# Patient Record
(demographics unavailable — no encounter records)

---

## 2025-03-23 NOTE — REVIEW OF SYSTEMS
[Negative] : Genitourinary Ear Wedge Repair Text: Due to exposed cartilage and to restore structure and function of the ear, a wedge excision was completed by carrying down an excision through the full thickness of the ear and cartilage with an inward facing Burow's triangle. The wound was then closed in a layered fashion.

## 2025-03-23 NOTE — DISCUSSION/SUMMARY
[Normal Growth] : growth [Normal Development] : development  [Physical Growth and Development] : physical growth and development [Social and Academic Competence] : social and academic competence [Emotional Well-Being] : emotional well-being [Risk Reduction] : risk reduction [Violence and Injury Prevention] : violence and injury prevention [Full Activity without restrictions including Physical Education & Athletics] : Full Activity without restrictions including Physical Education & Athletics [] : The components of the vaccine(s) to be administered today are listed in the plan of care. The disease(s) for which the vaccine(s) are intended to prevent and the risks have been discussed with the caretaker.  The risks are also included in the appropriate vaccination information statements which have been provided to the patient's caregiver.  The caregiver has given consent to vaccinate.

## 2025-03-23 NOTE — DISCUSSION/SUMMARY
[Normal Growth] : growth [Normal Development] : development  [Physical Growth and Development] : physical growth and development [Emotional Well-Being] : emotional well-being [Social and Academic Competence] : social and academic competence [Risk Reduction] : risk reduction [Violence and Injury Prevention] : violence and injury prevention [Full Activity without restrictions including Physical Education & Athletics] : Full Activity without restrictions including Physical Education & Athletics [] : The components of the vaccine(s) to be administered today are listed in the plan of care. The disease(s) for which the vaccine(s) are intended to prevent and the risks have been discussed with the caretaker.  The risks are also included in the appropriate vaccination information statements which have been provided to the patient's caregiver.  The caregiver has given consent to vaccinate.

## 2025-03-25 NOTE — PHYSICAL EXAM
[Alert] : alert [No Acute Distress] : no acute distress [Normocephalic] : normocephalic [EOMI Bilateral] : EOMI bilateral [Clear tympanic membranes with bony landmarks and light reflex present bilaterally] : clear tympanic membranes with bony landmarks and light reflex present bilaterally  [Pink Nasal Mucosa] : pink nasal mucosa [Nonerythematous Oropharynx] : nonerythematous oropharynx [Supple, full passive range of motion] : supple, full passive range of motion [Clear to Auscultation Bilaterally] : clear to auscultation bilaterally [No Palpable Masses] : no palpable masses [Regular Rate and Rhythm] : regular rate and rhythm [Normal S1, S2 audible] : normal S1, S2 audible [No Murmurs] : no murmurs [Soft] : soft [NonTender] : non tender [Non Distended] : non distended [No Hepatomegaly] : no hepatomegaly [No Splenomegaly] : no splenomegaly [Wes: ____] : Wes [unfilled] [Wes: _____] : Wes [unfilled] [No Abnormal Lymph Nodes Palpated] : no abnormal lymph nodes palpated [Normal Muscle Tone] : normal muscle tone [No Gait Asymmetry] : no gait asymmetry [Straight] : straight [Cranial Nerves Grossly Intact] : cranial nerves grossly intact [No Rash or Lesions] : no rash or lesions [FreeTextEntry6] : last year [de-identified] : Evaluation for scoliosis:  No scoliosis on exam, ( Normal Ramos Forward Bend Test).

## 2025-03-25 NOTE — HISTORY OF PRESENT ILLNESS
[Mother] : mother [Yes] : Patient goes to dentist yearly [Eats meals with family] : eats meals with family [Has family members/adults to turn to for help] : has family members/adults to turn to for help [Has friends] : has friends [Uses safety belts/safety equipment] : uses safety belts/safety equipment  [No] : No cigarette smoke exposure [Has ways to cope with stress] : has ways to cope with stress [Displays self-confidence] : displays self-confidence [Premenarche] : premenarche [Sleep Concerns] : no sleep concerns [Has concerns about body or appearance] : does not have concerns about body or appearance [Has problems with sleep] : does not have problems with sleep [Gets depressed, anxious, or irritable/has mood swings] : does not get depressed, anxious, or irritable/has mood swings [Has thought about hurting self or considered suicide] : has not thought about hurting self or considered suicide [de-identified] : Doing well in school socially and academically.

## 2025-03-25 NOTE — PHYSICAL EXAM
[Alert] : alert [No Acute Distress] : no acute distress [Normocephalic] : normocephalic [EOMI Bilateral] : EOMI bilateral [Clear tympanic membranes with bony landmarks and light reflex present bilaterally] : clear tympanic membranes with bony landmarks and light reflex present bilaterally  [Pink Nasal Mucosa] : pink nasal mucosa [Nonerythematous Oropharynx] : nonerythematous oropharynx [Supple, full passive range of motion] : supple, full passive range of motion [No Palpable Masses] : no palpable masses [Clear to Auscultation Bilaterally] : clear to auscultation bilaterally [Regular Rate and Rhythm] : regular rate and rhythm [Normal S1, S2 audible] : normal S1, S2 audible [No Murmurs] : no murmurs [Soft] : soft [NonTender] : non tender [Non Distended] : non distended [No Hepatomegaly] : no hepatomegaly [No Splenomegaly] : no splenomegaly [Wes: ____] : Wes [unfilled] [Wes: _____] : Wes [unfilled] [No Abnormal Lymph Nodes Palpated] : no abnormal lymph nodes palpated [Normal Muscle Tone] : normal muscle tone [No Gait Asymmetry] : no gait asymmetry [Straight] : straight [Cranial Nerves Grossly Intact] : cranial nerves grossly intact [No Rash or Lesions] : no rash or lesions [FreeTextEntry6] : last year [de-identified] : Evaluation for scoliosis:  No scoliosis on exam, ( Normal Ramos Forward Bend Test).

## 2025-03-25 NOTE — HISTORY OF PRESENT ILLNESS
[Mother] : mother [Yes] : Patient goes to dentist yearly [Eats meals with family] : eats meals with family [Has family members/adults to turn to for help] : has family members/adults to turn to for help [Has friends] : has friends [No] : No cigarette smoke exposure [Uses safety belts/safety equipment] : uses safety belts/safety equipment  [Has ways to cope with stress] : has ways to cope with stress [Displays self-confidence] : displays self-confidence [Premenarche] : premenarche [Sleep Concerns] : no sleep concerns [Has concerns about body or appearance] : does not have concerns about body or appearance [Has problems with sleep] : does not have problems with sleep [Gets depressed, anxious, or irritable/has mood swings] : does not get depressed, anxious, or irritable/has mood swings [Has thought about hurting self or considered suicide] : has not thought about hurting self or considered suicide [de-identified] : Doing well in school socially and academically.